# Patient Record
Sex: FEMALE | Race: WHITE | NOT HISPANIC OR LATINO | Employment: OTHER | URBAN - NONMETROPOLITAN AREA
[De-identification: names, ages, dates, MRNs, and addresses within clinical notes are randomized per-mention and may not be internally consistent; named-entity substitution may affect disease eponyms.]

---

## 2017-06-14 ENCOUNTER — OUTSIDE FACILITY SERVICE (OUTPATIENT)
Dept: CARDIOLOGY | Facility: CLINIC | Age: 82
End: 2017-06-14

## 2017-06-14 PROCEDURE — 93306 TTE W/DOPPLER COMPLETE: CPT | Performed by: INTERNAL MEDICINE

## 2017-06-21 ENCOUNTER — OUTSIDE FACILITY SERVICE (OUTPATIENT)
Dept: CARDIOLOGY | Facility: CLINIC | Age: 82
End: 2017-06-21

## 2017-06-21 PROCEDURE — 99222 1ST HOSP IP/OBS MODERATE 55: CPT | Performed by: INTERNAL MEDICINE

## 2017-08-08 ENCOUNTER — OFFICE VISIT (OUTPATIENT)
Dept: CARDIOLOGY | Facility: CLINIC | Age: 82
End: 2017-08-08

## 2017-08-08 VITALS
DIASTOLIC BLOOD PRESSURE: 50 MMHG | OXYGEN SATURATION: 95 % | BODY MASS INDEX: 26.33 KG/M2 | HEART RATE: 69 BPM | HEIGHT: 65 IN | WEIGHT: 158 LBS | SYSTOLIC BLOOD PRESSURE: 90 MMHG

## 2017-08-08 DIAGNOSIS — I25.118 CORONARY ARTERY DISEASE OF NATIVE ARTERY OF NATIVE HEART WITH STABLE ANGINA PECTORIS (HCC): Chronic | ICD-10-CM

## 2017-08-08 DIAGNOSIS — I10 BENIGN ESSENTIAL HYPERTENSION: Chronic | ICD-10-CM

## 2017-08-08 DIAGNOSIS — I48.0 PAROXYSMAL ATRIAL FIBRILLATION (HCC): Primary | Chronic | ICD-10-CM

## 2017-08-08 PROCEDURE — 93000 ELECTROCARDIOGRAM COMPLETE: CPT | Performed by: NURSE PRACTITIONER

## 2017-08-08 PROCEDURE — 99214 OFFICE O/P EST MOD 30 MIN: CPT | Performed by: NURSE PRACTITIONER

## 2017-08-08 RX ORDER — DILTIAZEM HYDROCHLORIDE 120 MG/1
120 CAPSULE, EXTENDED RELEASE ORAL DAILY
COMMUNITY

## 2017-08-08 RX ORDER — ATORVASTATIN CALCIUM 10 MG/1
10 TABLET, FILM COATED ORAL NIGHTLY
COMMUNITY

## 2017-08-08 RX ORDER — ISOSORBIDE MONONITRATE 30 MG/1
30 TABLET, EXTENDED RELEASE ORAL DAILY
COMMUNITY

## 2017-08-08 RX ORDER — RAMIPRIL 2.5 MG/1
2.5 CAPSULE ORAL DAILY
Qty: 90 CAPSULE | Refills: 3 | Status: SHIPPED | OUTPATIENT
Start: 2017-08-08 | End: 2018-08-08

## 2017-08-08 RX ORDER — TORSEMIDE 5 MG/1
5 TABLET ORAL EVERY OTHER DAY
COMMUNITY

## 2017-08-08 RX ORDER — DONEPEZIL HYDROCHLORIDE 23 MG/1
23 TABLET, FILM COATED ORAL NIGHTLY
COMMUNITY

## 2017-08-08 RX ORDER — CLOPIDOGREL BISULFATE 75 MG/1
75 TABLET ORAL DAILY
COMMUNITY
End: 2017-08-08

## 2017-08-08 RX ORDER — MEMANTINE HYDROCHLORIDE 10 MG/1
10 TABLET ORAL 2 TIMES DAILY
COMMUNITY

## 2017-08-08 RX ORDER — RAMIPRIL 5 MG/1
5 CAPSULE ORAL DAILY
COMMUNITY
End: 2017-08-08 | Stop reason: SDUPTHER

## 2017-08-08 RX ORDER — EZETIMIBE 10 MG/1
10 TABLET ORAL DAILY
COMMUNITY

## 2017-08-08 NOTE — PROGRESS NOTES
Subjective:     Encounter Date:08/08/2017    Chief Complaint:    Patient ID: Yesenia Portillo is a 86 y.o. female here today for cardiac follow-up. She is accompanied by her daughter-in-law Alecia. She resides at [facility] with her . She does not participate in many activities - prefers to stay with  even when he sleeps all morning.     HPI     Atrial Fibrillation    Additional comments: paroxysmal June 2017 when had pneumonia, started on Eliquis, no bleeding or cost issues, doesn't think she's had any palpitations other than when she gets nervous, breathing better           Coronary Artery Disease    Additional comments: occasional chest discomfort that she thinks is nerves, doesn't remember previous angina before CABG in 2009, hasn't had a recent stress test       Last edited by AMY Aguilar on 8/8/2017 10:13 AM. (History)        History:   Past Medical History:   Diagnosis Date   • Alzheimer disease    • Anterior chest wall pain    • Atrial paroxysmal tachycardia    • Benign essential hypertension    • Bilateral cataracts    • CAD (coronary artery disease)    • COPD (chronic obstructive pulmonary disease)    • Glaucoma    • Hyperlipidemia    • Myocardial infarction    • Osteoporosis    • Palpitations    • Paroxysmal atrial fibrillation    • Peripheral neuropathy    • Pre-op evaluation    • Syncope      Past Surgical History:   Procedure Laterality Date   • BREAST LUMPECTOMY Left    • COLONOSCOPY W/ POLYPECTOMY     • CORONARY ARTERY BYPASS GRAFT  03/2009     Social History     Social History   • Marital status:      Spouse name: N/A   • Number of children: N/A   • Years of education: N/A     Occupational History   • Not on file.     Social History Main Topics   • Smoking status: Never Smoker   • Smokeless tobacco: Never Used   • Alcohol use No   • Drug use: No   • Sexual activity: No     Other Topics Concern   • Not on file     Social History Narrative     Family  History   Problem Relation Age of Onset   • Diabetes Mother    • Stroke Mother    • Coronary artery disease Other        Outpatient Prescriptions Marked as Taking for the 8/8/17 encounter (Office Visit) with AMY Aguilar   Medication Sig Dispense Refill   • apixaban (ELIQUIS) 5 MG tablet tablet Take 5 mg by mouth 2 (Two) Times a Day.     • atorvastatin (LIPITOR) 10 MG tablet Take 10 mg by mouth Every Night.     • diltiazem XR (DILACOR XR) 120 MG 24 hr capsule Take 120 mg by mouth Daily.     • donepezil (ARICEPT) 23 MG tablet Take 23 mg by mouth Every Night.     • ezetimibe (ZETIA) 10 MG tablet Take 10 mg by mouth Daily.     • isosorbide mononitrate (IMDUR) 30 MG 24 hr tablet Take 30 mg by mouth Daily.     • memantine (NAMENDA) 10 MG tablet Take 10 mg by mouth 2 (Two) Times a Day.     • metoprolol tartrate (LOPRESSOR) 25 MG tablet Take 25 mg by mouth 2 (Two) Times a Day.     • ramipril (ALTACE) 2.5 MG capsule Take 1 capsule by mouth Daily. 90 capsule 3   • torsemide (DEMADEX) 5 MG tablet Take 5 mg by mouth Every Other Day.     • [DISCONTINUED] clopidogrel (PLAVIX) 75 MG tablet Take 75 mg by mouth Daily.     • [DISCONTINUED] ramipril (ALTACE) 5 MG capsule Take 5 mg by mouth Daily.         Review of Systems:  Review of Systems   Constitution: Positive for malaise/fatigue and weight gain. Negative for chills, decreased appetite and fever.   HENT: Positive for congestion. Negative for headaches and nosebleeds.    Eyes: Negative for blurred vision and double vision.   Cardiovascular: Positive for chest pain, dyspnea on exertion and palpitations. Negative for irregular heartbeat and leg swelling.   Respiratory: Positive for shortness of breath (with more than usual exertion). Negative for cough.    Endocrine: Negative for cold intolerance and heat intolerance.   Hematologic/Lymphatic: Bruises/bleeds easily.   Skin: Negative for dry skin, itching and rash.   Musculoskeletal: Positive for muscle cramps and neck  "pain. Negative for back pain and joint pain.   Gastrointestinal: Positive for heartburn. Negative for abdominal pain, constipation, diarrhea and melena.   Genitourinary: Negative for dysuria, frequency and hematuria.   Neurological: Positive for dizziness and light-headedness. Negative for loss of balance.   Psychiatric/Behavioral: Positive for depression and memory loss. The patient is nervous/anxious. The patient does not have insomnia.             Objective:     BP 90/50 (BP Location: Left arm, Patient Position: Sitting, Cuff Size: Large Adult)  Pulse 69  Ht 65\" (165.1 cm)  Wt 158 lb (71.7 kg)  SpO2 95%  BMI 26.29 kg/m2      Physical Exam   Constitutional: She appears well-developed and well-nourished.   HENT:   Right Ear: Decreased hearing is noted.   Left Ear: Decreased hearing is noted.   Eyes: No scleral icterus.   Neck: No JVD present.   Cardiovascular: Normal rate, regular rhythm, normal heart sounds and intact distal pulses.  Exam reveals no gallop and no friction rub.    No murmur heard.  Pulmonary/Chest: Effort normal and breath sounds normal.   Musculoskeletal: She exhibits no edema.   Neurological: She is alert.   Skin: Skin is warm and dry.   Psychiatric: She has a normal mood and affect. She expresses inappropriate judgment. She exhibits abnormal recent memory.   Vitals reviewed.      Lab/Diagnostics Review:   06/18/2017  CBC WBC 15,500, hemoglobin 13.9, hematocrit 42.6%, platelets 319,000  , potassium 3.9, chloride 103, CO2 29.5, BUN 43, creatinine 1.47, total protein 6.3, albumin 3.5, alkaline phosphatase 78, AST 23, ALT 29, calcium 7.9, glucose 141  Magnesium 2.3  T4 6.7, TSH 0.604    06/15/2017 2-D echocardiogram EF 65-70%, grade 2 diastolic dysfunction, aortic valve sclerosis without significant stenosis, mild MR, mild LAE, Normal RV size and function, mild TR     06/14/2017 EKG sinus rhythm 85 bpm occasional PVCs, moderate lateral ST depression    06/14/2017 CT chest with contrast " coronary artery disease without evidence of pulmonary embolism and no infiltrates noted.    3/8/2017 Lipid Panel total cholesterol 130, triglycerides 179, HDL 55, LDL 39      ECG 12 Lead  Date/Time: 8/8/2017 11:07 AM  Performed by: CATY RASCON  Authorized by: CATY RASCON   Comparison: compared with previous ECG from 6/14/2017  Similar to previous ECG  Rhythm: sinus rhythm  Ectopy: frequent PVCs  Rate: normal                  Assessment/Plan:         Yesenia was seen today for atrial fibrillation and coronary artery disease.    Diagnoses and all orders for this visit:    Paroxysmal atrial fibrillation  Comments:  anticoagulated with Eliquis without bleeding, maintaining sinus rhythm per today's EKG, call if begins to fall - may need to stop Eliquis at that time  Orders:  -     Adult Transthoracic Echo Complete    Coronary artery disease of native artery of native heart with stable angina pectoris  Comments:  family decline stress testing due to advanced dementia, continue medical management  Orders:  -     Adult Transthoracic Echo Complete    Benign essential hypertension  Comments:  well controlled with relative hypotension, probable orthostasis, continue metoprolol for rate control, decrease ramipril  Orders:  -     ramipril (ALTACE) 2.5 MG capsule; Take 1 capsule by mouth Daily.    Hyperlipidemia, unspecified hyperlipidemia type  Comments:  reportedly controlled on atorvastatin      Family to call if begins to complain of chest discomfort. Can adjust antianginal medications. They are not interested in any aggressive or invasive testing.     Return in about 6 months (around 2/8/2018) for Recheck.           Caty Rascon, APRN, ACNP-BC, CHFN-BC

## 2017-11-13 ENCOUNTER — TELEPHONE (OUTPATIENT)
Dept: CARDIOLOGY | Facility: CLINIC | Age: 82
End: 2017-11-13

## 2017-11-13 ENCOUNTER — CLINICAL SUPPORT (OUTPATIENT)
Dept: CARDIOLOGY | Facility: CLINIC | Age: 82
End: 2017-11-13

## 2017-11-13 DIAGNOSIS — I48.0 PAROXYSMAL ATRIAL FIBRILLATION (HCC): Primary | ICD-10-CM

## 2017-11-13 PROCEDURE — 93000 ELECTROCARDIOGRAM COMPLETE: CPT | Performed by: NURSE PRACTITIONER

## 2017-11-13 NOTE — PROGRESS NOTES
ECG 12 Lead  Date/Time: 11/13/2017 4:04 PM  Performed by: ALAN RASCON  Authorized by: ALAN RASCON   Comparison: compared with previous ECG from 8/8/2017  Similar to previous ECG  Rhythm: sinus rhythm  Ectopy: unifocal PVCs  Rate: normal  BPM: 74  ST segment depression noted on lead: nonspecific ST abnormality.  Clinical impression: abnormal ECG          Lakisha Chelsy AMY's grandmother - wanted EKG checked to make sure not back in afib due to dizziness and shortness of air. Lungs clear. No edema. Resp's a little shallow and fast. No dyspnea at rest. Discussed orthostatic precautions. Call if worsens. Accompanied by daughter.

## 2017-11-13 NOTE — TELEPHONE ENCOUNTER
Lakisha Valentino NP called requesting to speak with you directly. This patient is her grandmother and is having SOB and dizziness. Could you call her back on her cell ?    Thanks

## 2020-02-05 ENCOUNTER — OUTSIDE FACILITY SERVICE (OUTPATIENT)
Dept: CARDIOLOGY | Facility: CLINIC | Age: 85
End: 2020-02-05

## 2020-02-05 PROCEDURE — 99222 1ST HOSP IP/OBS MODERATE 55: CPT | Performed by: INTERNAL MEDICINE

## 2020-02-05 PROCEDURE — 93306 TTE W/DOPPLER COMPLETE: CPT | Performed by: INTERNAL MEDICINE

## 2020-02-18 ENCOUNTER — TELEPHONE (OUTPATIENT)
Dept: CARDIOLOGY | Facility: CLINIC | Age: 85
End: 2020-02-18

## 2020-02-18 NOTE — TELEPHONE ENCOUNTER
----- Message from Faby Hess CMA sent at 2/18/2020 10:03 AM CST -----  PT CHARGES HAVE NOT BEEN PUT IN THE SYSTEM DUE TO HER STILL BEING IN HOSPITAL YES OUR DRS HAVE CONSULTED HER AS SOON AS SHE IS DISCHARGED I WILL SCAN  THESE IN   ----- Message -----  From: Yvonne Moody MA  Sent: 2/18/2020   9:03 AM CST  To: Yvonne Moody MA, Faby Hess CMA    Can you get hospital notes  from [facility] ER please. Dr. Dash read an echo done on 2/5/20. Pt has an appt with Dr. Dash on 3/18. Did our cardiologist  see this pt in consult while in hospital? Pt has not been in the office since 8/2017.